# Patient Record
(demographics unavailable — no encounter records)

---

## 2024-10-30 NOTE — PHYSICAL EXAM
[Chaperone Present] : A chaperone was present in the examining room during all aspects of the physical examination [22841] : A chaperone was present during the pelvic exam. [FreeTextEntry2] : SAE [Appropriately responsive] : appropriately responsive [Alert] : alert [No Acute Distress] : no acute distress [No Lymphadenopathy] : no lymphadenopathy [Regular Rate Rhythm] : regular rate rhythm [No Murmurs] : no murmurs [Clear to Auscultation B/L] : clear to auscultation bilaterally [Soft] : soft [Non-tender] : non-tender [Non-distended] : non-distended [No HSM] : No HSM [No Lesions] : no lesions [No Mass] : no mass [Oriented x3] : oriented x3 [Labia Majora] : normal [Labia Minora] : normal [Normal] : normal [Uterine Adnexae] : normal

## 2024-10-30 NOTE — END OF VISIT
[FreeTextEntry4] : I, Caramarti Alberto, acted as a scribe on behalf of Dr. Javier Briseno on 10/23/2024 .   All medical entries made by the scribe were at my, Dr. Javier Briseno, direction and personally dictated by me on 10/23/2024. I have reviewed the chart and agree that the record accurately reflects my personal performance of the history, physical exam, assessment and plan. I have also personally directed, reviewed, and agreed with the chart.

## 2024-10-30 NOTE — PLAN
[FreeTextEntry1] : 41 year yo P0 here for missed menses   pelvic sono shows SLIUP @ 8 weeks 2 d by LMP c/w early ultrasound.   Routine new OB labs. Welcome letter given.  RTO for initial OB visit and counseling

## 2024-10-30 NOTE — HISTORY OF PRESENT ILLNESS
[FreeTextEntry1] : 41 year old  LMP 24 presents for a follow up visit for missed menses. Pt has been trying to conceive and had multiple rounds of IVF with Dr. Calderon. Pt conceived naturally.   She is doing well and has no complaints. She denies vb, nausea or vomiting. Pt reports minor cramping. Pt reports taking 81 mg bASA 1x day, directed by Dr. Calderon. Pt is also taking PNV.  Pt had US today, 10/23/2024 - SLIUP at 8 weeks, 2 days. ABRAHAM 2024. FHR= 168. Corpus luteum cyst is present. 6 myomas (ranging from 2.5 cm - 4.6 cm) seen.

## 2024-10-30 NOTE — PHYSICAL EXAM
[Chaperone Present] : A chaperone was present in the examining room during all aspects of the physical examination [00996] : A chaperone was present during the pelvic exam. [FreeTextEntry2] : SAE [Appropriately responsive] : appropriately responsive [Alert] : alert [No Acute Distress] : no acute distress [No Lymphadenopathy] : no lymphadenopathy [Regular Rate Rhythm] : regular rate rhythm [No Murmurs] : no murmurs [Clear to Auscultation B/L] : clear to auscultation bilaterally [Soft] : soft [Non-tender] : non-tender [Non-distended] : non-distended [No HSM] : No HSM [No Lesions] : no lesions [No Mass] : no mass [Oriented x3] : oriented x3 [Labia Majora] : normal [Labia Minora] : normal [Normal] : normal [Uterine Adnexae] : normal

## 2025-01-03 NOTE — HISTORY OF PRESENT ILLNESS
[FreeTextEntry1] : 41 year old woman  who is 18 weeks pregnant (ABRAHAM 25) who was diagnosed with hypertension in . She was put on meds per her PCP- Labetalol. She is seen by Dr. Briseno and she was increased during the pregnancy to 600 mg TID and now added Nifedipine 30 mg daily (added beginning of December). Feels well. No headache, dyspnea or chest pain EKG normal On ASA 81 mg daily Will discuss with OB- regarding 162 mg daily

## 2025-01-03 NOTE — DISCUSSION/SUMMARY
[FreeTextEntry1] : 41 year old woman  who is 18 weeks pregnant (ABRAHAM 25) who was diagnosed with hypertension in . She was put on meds per her PCP- Labetalol. She is seen by Dr. Briseno and she was increased during the pregnancy to 600 mg TID and now added Nifedipine 30 mg daily (added beginning of December). Feels well. No headache, dyspnea or chest pain EKG normal On ASA 81 mg daily Will discuss with OB- regarding 162 mg daily Continue current meds (labetalol 600 mg TID/Nifedipine 30 mg daily) Check TTE FU 2 month [EKG obtained to assist in diagnosis and management of assessed problem(s)] : EKG obtained to assist in diagnosis and management of assessed problem(s)

## 2025-03-03 NOTE — HISTORY OF PRESENT ILLNESS
[FreeTextEntry1] : 41 year old woman  who is 26 weeks pregnant (ABRAHAM 25) who was diagnosed with hypertension in . She was put on meds per her PCP- Labetalol. She is seen by Dr. Briseno and she was increased during the pregnancy to 600 mg TID and now added Nifedipine 30 mg daily (added beginning of December). Feels well. No headache, dyspnea or chest pain EKG normal On ASA TTE  1. Left ventricular systolic function is normal with an ejection fraction of 69 % by Michael's method of disks.  2. Normal right ventricular cavity size, with normal wall thickness, and normal right ventricular systolic function.  3. Mild mitral regurgitation.

## 2025-03-03 NOTE — DISCUSSION/SUMMARY
[EKG obtained to assist in diagnosis and management of assessed problem(s)] : EKG obtained to assist in diagnosis and management of assessed problem(s) [FreeTextEntry1] : 41 year old woman  who is 26 weeks pregnant (ABRAHAM 25) who was diagnosed with hypertension in . She was put on meds per her PCP- Labetalol. She is seen by Dr. Briseno and she was increased during the pregnancy to 600 mg TID and now added Nifedipine 30 mg daily (added beginning of December). Feels well. No headache, dyspnea or chest pain EKG normal On ASA  Continue current meds Nifedipine 30 mg daily although BP is low and dc labetalol (she had been taking only 300 mg as needed- did take today)  FU 2 month

## 2025-05-06 NOTE — HISTORY OF PRESENT ILLNESS
[FreeTextEntry1] : 41 year old woman  who is 35 weeks pregnant (ABRAHAM 25) who was diagnosed with hypertension in . She was put on meds per her PCP- Labetalol. She is seen by Dr. Briseno and she was increased during the pregnancy to 600 mg TID and now added Nifedipine 30 mg daily (added beginning of December). Feels well. No headache, dyspnea or chest pain In March-  dc'd her labetalol and maintained now on Nifedipine 30 mg daily EKG normal On ASA TTE  1. Left ventricular systolic function is normal with an ejection fraction of 69 % by Michael's method of disks.  2. Normal right ventricular cavity size, with normal wall thickness, and normal right ventricular systolic function.  3. Mild mitral regurgitation.

## 2025-05-06 NOTE — DISCUSSION/SUMMARY
[FreeTextEntry1] : 41 year old woman  who is 35 weeks pregnant (ABRAHAM 25) who was diagnosed with hypertension in . She was put on meds per her PCP- Labetalol. She is seen by Dr. Briseno and she was increased during the pregnancy to 600 mg TID and now added Nifedipine 30 mg daily (added beginning of December). Labetalol dc'd in March Feels well. No headache, dyspnea or chest pain EKG normal On ASA  Continue current meds Nifedipine 30 mg daily   FU 6 weeks [EKG obtained to assist in diagnosis and management of assessed problem(s)] : EKG obtained to assist in diagnosis and management of assessed problem(s)

## 2025-06-18 NOTE — HISTORY OF PRESENT ILLNESS
[FreeTextEntry1] : 41 year old woman  who delivered on 25 at 38 weeks- diagnosed with hypertension in . She was put on meds per her PCP- Labetalol. She is seen by Dr. Briseno and she was increased during the pregnancy to 600 mg TID and now added Nifedipine 30 mg daily (added beginning of December). Feels well. No headache, dyspnea or chest pain In March- we dc'd her labetalol and maintained on Nifedipine 30 mg daily Dr increased to 60 mg daily   EKG normal On ASA TTE  1. Left ventricular systolic function is normal with an ejection fraction of 69 % by Michael's method of disks.  2. Normal right ventricular cavity size, with normal wall thickness, and normal right ventricular systolic function.  3. Mild mitral regurgitation.

## 2025-06-18 NOTE — DISCUSSION/SUMMARY
[FreeTextEntry1] : 41 year old woman  who delivered on 25 at 38 weeks- diagnosed with hypertension in . She was put on meds per her PCP- Labetalol. She is seen by Dr. Briseno and she was increased during the pregnancy to 600 mg TID and now added Nifedipine 30 mg daily (added beginning of December). Feels well. No headache, dyspnea or chest pain In March- we dc'd her labetalol and maintained on Nifedipine 30 mg daily Dr increased to 60 mg daily  EKG normal On ASA  Will add labetalol 200 mg BID FU 2 weeks [EKG obtained to assist in diagnosis and management of assessed problem(s)] : EKG obtained to assist in diagnosis and management of assessed problem(s)

## 2025-07-01 NOTE — HISTORY OF PRESENT ILLNESS
[FreeTextEntry1] : 41 year old woman  who delivered on 25 at 38 weeks- diagnosed with hypertension in  presents in for follow up 5 weeks PP . She was put on meds per her PCP- Labetalol. She is seen by Dr. Briseno and she was increased during the pregnancy to 600 mg TID and now added Nifedipine 30 mg daily (added beginning of December). Feels well. No headache, dyspnea or chest pain. In March-  dc'd her labetalol and maintained on Nifedipine 30 mg daily. During her last follow up Nifedipine was increased to 60 mg QD along with Labetalol 200 mg bid.   EKG - Normal.  TTE  1. Left ventricular systolic function is normal with an ejection fraction of 69 % by Michael's method of disks.  2. Normal right ventricular cavity size, with normal wall thickness, and normal right ventricular systolic function.  3. Mild mitral regurgitation.

## 2025-07-01 NOTE — CARDIOLOGY SUMMARY
[de-identified] : SR 70s  [de-identified] :  TTE  1. Left ventricular systolic function is normal with an ejection fraction of 69 % by Michael's method of disks.  2. Normal right ventricular cavity size, with normal wall thickness, and normal right ventricular systolic function.  3. Mild mitral regurgitation.

## 2025-07-01 NOTE — DISCUSSION/SUMMARY
[FreeTextEntry1] : 41 year old woman   on 25 at 38 weeks carries a PMH of hypertension in  presents in for follow up 5 weeks PP. She was put on meds per her PCP- Labetalol. She is seen by Dr. Briseno and she was increased during the pregnancy to 600 mg TID and now added Nifedipine 30 mg daily (added beginning of December). Feels well. No headache, dyspnea or chest pain. In March-  dc'd her labetalol and maintained on Nifedipine 30 mg daily. During her last follow up Nifedipine was increased to 60 mg QD along with Labetalol 200 mg bid.   PP HTN: BP Stable ( 124/87)  Continue Labetalol 200 mg bid and Nifedipine 60 mg Qnoon  Encouraged Patient to monitor BP at home, keep a log, and report results back to us for evaluation. Based on the results, we will adjust medications as necessary. Additionally, encouraged a heart-healthy diet and exercise as tolerated. EKG with no acute changes.   F/u 4 weeks    [EKG obtained to assist in diagnosis and management of assessed problem(s)] : EKG obtained to assist in diagnosis and management of assessed problem(s)